# Patient Record
Sex: FEMALE | Race: ASIAN | NOT HISPANIC OR LATINO
[De-identification: names, ages, dates, MRNs, and addresses within clinical notes are randomized per-mention and may not be internally consistent; named-entity substitution may affect disease eponyms.]

---

## 2018-06-19 ENCOUNTER — RESULT REVIEW (OUTPATIENT)
Age: 53
End: 2018-06-19

## 2021-02-10 PROBLEM — Z00.00 ENCOUNTER FOR PREVENTIVE HEALTH EXAMINATION: Status: ACTIVE | Noted: 2021-02-10

## 2021-02-11 ENCOUNTER — OUTPATIENT (OUTPATIENT)
Dept: OUTPATIENT SERVICES | Facility: HOSPITAL | Age: 56
LOS: 1 days | End: 2021-02-11
Payer: COMMERCIAL

## 2021-02-11 ENCOUNTER — APPOINTMENT (OUTPATIENT)
Dept: MRI IMAGING | Facility: IMAGING CENTER | Age: 56
End: 2021-02-11
Payer: COMMERCIAL

## 2021-02-11 ENCOUNTER — RESULT REVIEW (OUTPATIENT)
Age: 56
End: 2021-02-11

## 2021-02-11 DIAGNOSIS — Z98.51 TUBAL LIGATION STATUS: Chronic | ICD-10-CM

## 2021-02-11 DIAGNOSIS — G54.2 CERVICAL ROOT DISORDERS, NOT ELSEWHERE CLASSIFIED: ICD-10-CM

## 2021-02-11 PROCEDURE — 72141 MRI NECK SPINE W/O DYE: CPT

## 2021-03-04 PROBLEM — Z00.00 ENCOUNTER FOR PREVENTIVE HEALTH EXAMINATION: Noted: 2021-03-04

## 2021-03-05 ENCOUNTER — APPOINTMENT (OUTPATIENT)
Dept: ORTHOPEDIC SURGERY | Facility: CLINIC | Age: 56
End: 2021-03-05
Payer: COMMERCIAL

## 2021-03-05 VITALS — BODY MASS INDEX: 22.66 KG/M2 | WEIGHT: 120 LBS | HEIGHT: 61 IN

## 2021-03-05 VITALS — DIASTOLIC BLOOD PRESSURE: 74 MMHG | SYSTOLIC BLOOD PRESSURE: 104 MMHG

## 2021-03-05 DIAGNOSIS — M47.812 SPONDYLOSIS W/OUT MYELOPATHY OR RADICULOPATHY, CERVICAL REGION: ICD-10-CM

## 2021-03-05 DIAGNOSIS — G89.29 OTHER SPECIFIED DISORDERS OF BONE, SHOULDER: ICD-10-CM

## 2021-03-05 DIAGNOSIS — M89.8X1 OTHER SPECIFIED DISORDERS OF BONE, SHOULDER: ICD-10-CM

## 2021-03-05 DIAGNOSIS — M50.90 CERVICAL DISC DISORDER, UNSPECIFIED, UNSPECIFIED CERVICAL REGION: ICD-10-CM

## 2021-03-05 RX ORDER — MELOXICAM 15 MG/1
15 TABLET ORAL DAILY
Qty: 14 | Refills: 0 | Status: ACTIVE | COMMUNITY
Start: 2021-03-05 | End: 1900-01-01

## 2021-03-06 PROBLEM — M50.90 CERVICAL DISC DISEASE: Status: ACTIVE | Noted: 2021-03-06

## 2021-03-06 PROBLEM — M89.8X1 CHRONIC SCAPULAR PAIN: Status: ACTIVE | Noted: 2021-03-06

## 2021-03-06 PROBLEM — M47.812 CERVICAL ARTHRITIS: Status: ACTIVE | Noted: 2021-03-06

## 2021-07-01 ENCOUNTER — EMERGENCY (EMERGENCY)
Facility: HOSPITAL | Age: 56
LOS: 1 days | Discharge: HOME | End: 2021-07-01
Admitting: STUDENT IN AN ORGANIZED HEALTH CARE EDUCATION/TRAINING PROGRAM
Payer: COMMERCIAL

## 2021-07-01 VITALS
HEIGHT: 62 IN | HEART RATE: 70 BPM | RESPIRATION RATE: 16 BRPM | OXYGEN SATURATION: 100 % | TEMPERATURE: 98 F | DIASTOLIC BLOOD PRESSURE: 72 MMHG | SYSTOLIC BLOOD PRESSURE: 134 MMHG

## 2021-07-01 VITALS
RESPIRATION RATE: 16 BRPM | TEMPERATURE: 98 F | OXYGEN SATURATION: 100 % | DIASTOLIC BLOOD PRESSURE: 70 MMHG | SYSTOLIC BLOOD PRESSURE: 112 MMHG | HEART RATE: 62 BPM

## 2021-07-01 DIAGNOSIS — Z98.51 TUBAL LIGATION STATUS: Chronic | ICD-10-CM

## 2021-07-01 PROCEDURE — 73140 X-RAY EXAM OF FINGER(S): CPT | Mod: 26,LT

## 2021-07-01 PROCEDURE — 99284 EMERGENCY DEPT VISIT MOD MDM: CPT

## 2021-07-01 RX ORDER — IBUPROFEN 200 MG
1 TABLET ORAL
Qty: 20 | Refills: 0
Start: 2021-07-01

## 2021-07-01 RX ORDER — TETANUS TOXOID, REDUCED DIPHTHERIA TOXOID AND ACELLULAR PERTUSSIS VACCINE, ADSORBED 5; 2.5; 8; 8; 2.5 [IU]/.5ML; [IU]/.5ML; UG/.5ML; UG/.5ML; UG/.5ML
0.5 SUSPENSION INTRAMUSCULAR ONCE
Refills: 0 | Status: COMPLETED | OUTPATIENT
Start: 2021-07-01 | End: 2021-07-01

## 2021-07-01 RX ORDER — IBUPROFEN 200 MG
600 TABLET ORAL ONCE
Refills: 0 | Status: COMPLETED | OUTPATIENT
Start: 2021-07-01 | End: 2021-07-01

## 2021-07-01 RX ADMIN — TETANUS TOXOID, REDUCED DIPHTHERIA TOXOID AND ACELLULAR PERTUSSIS VACCINE, ADSORBED 0.5 MILLILITER(S): 5; 2.5; 8; 8; 2.5 SUSPENSION INTRAMUSCULAR at 10:50

## 2021-07-01 RX ADMIN — Medication 1 TABLET(S): at 12:23

## 2021-07-01 RX ADMIN — Medication 600 MILLIGRAM(S): at 10:50

## 2021-07-01 NOTE — ED PROVIDER NOTE - CARE PLAN
Principal Discharge DX:	Closed nondisplaced fracture of distal phalanx of left thumb, initial encounter

## 2021-07-01 NOTE — ED PROVIDER NOTE - EXTREMITY EXAM
left thumb: +mild subungal hematoma, +swelling distally with TTP, FROM at PIP joint left thumb: superficial laceration to dorsal and volar aspect PIP region, no active bleeding +mild subungal hematoma, +swelling distally with TTP, FROM at PIP joint

## 2021-07-01 NOTE — ED PROVIDER NOTE - OBJECTIVE STATEMENT
57 yo female c pmhx HTN presents to ED c/o left thumb pain s/p injury yesterday.  Pt states jammed her finger between car door, woke up this am with worsening swelling and pain.  DEnies any other injuries.  Tdap not UTD.

## 2021-07-01 NOTE — ED ADULT NURSE REASSESSMENT NOTE - NS ED NURSE REASSESS COMMENT FT1
Pt vitally stable, No complaints at this time. Pt discharged. Discharge paperwork given to patient by MD.

## 2021-07-01 NOTE — ED PROVIDER NOTE - NSFOLLOWUPINSTRUCTIONS_ED_ALL_ED_FT
You have a thumb fracture that is not displaced.  Be sure to keep your thumb in the splint for atleast 4-6 weeks.  Take motrin 600mg every 8 hours as needed for pain.  Take augmentin 875mg 2x/day for 7 days.  Keep thumb elevated, ice, rest it.  Follow up with a hand specialist within one week.  Return to ED for any worsening swelling, numbness, fever.

## 2021-07-01 NOTE — ED ADULT TRIAGE NOTE - CHIEF COMPLAINT QUOTE
Pt c/o of pain to left thumb s/p door slammed yesterday. swelling and redness observed. Denies fever/cough.

## 2021-07-01 NOTE — ED PROVIDER NOTE - CLINICAL SUMMARY MEDICAL DECISION MAKING FREE TEXT BOX
57 yo female c pmhx HTN presents to ED c/o left thumb pain s/p injury yesterday.   +diffuse swelling distally with mild subungal hematoma; r/o fx, will get xray, nsaids for pain, tdap

## 2021-07-01 NOTE — ED PROVIDER NOTE - PROGRESS NOTE DETAILS
Pt noted to have distal phalanx fracture, nondisplaced, placed in splint, dc lounge assisting with hand follow up appt.   Will give antibiotics given subungal hematoma.

## 2021-07-01 NOTE — ED PROVIDER NOTE - PATIENT PORTAL LINK FT
You can access the FollowMyHealth Patient Portal offered by [facility] by registering at the following website: [email or web address]. By joining [employer]’s FollowMyHealth portal, you will also be able to view your health information using other applications (apps) compatible with our system.

## 2021-07-12 ENCOUNTER — NON-APPOINTMENT (OUTPATIENT)
Age: 56
End: 2021-07-12

## 2021-07-12 ENCOUNTER — APPOINTMENT (OUTPATIENT)
Dept: ORTHOPEDIC SURGERY | Facility: CLINIC | Age: 56
End: 2021-07-12
Payer: COMMERCIAL

## 2021-07-12 DIAGNOSIS — S62.525A NONDISPLACED FRACTURE OF DISTAL PHALANX OF LEFT THUMB, INITIAL ENCOUNTER FOR CLOSED FRACTURE: ICD-10-CM

## 2021-07-12 NOTE — DISCUSSION/SUMMARY
[de-identified] : The underlying pathophysiology was reviewed with the patient. XR films were reviewed with the patient. Discussed at length the nature of the patient’s condition. The left thumb symptoms appear secondary to fracture of the distal phalanx.\par \par I discussed with the patient that the total healing time of her fracture will be 4 weeks. It has been two weeks since the date of injury. \par Patient advised that the best treatment is movement of the affected joint.\par \par Patient can continue activities as tolerated. All questions answered, understanding verbalized. Patient in agreement with plan of care. Follow up in

## 2021-07-12 NOTE — HISTORY OF PRESENT ILLNESS
[de-identified] : Pt is a 57 y/o female with left thumb fracture.  She shut her finger in a car door on Thursday 7/1/21.  She had pain immediately.  She went to [facility] ED where xrays were taken.  They revealed a left thumb distal phalanx fracture.  She has ecchymosis in the nail bed.  A splint was applied and she was advised to follow up with a hand specialist.

## 2021-07-12 NOTE — PHYSICAL EXAM
[de-identified] : Patient is WDWN, alert, and in no acute distress. Breathing is unlabored. She is grossly oriented to person, place, \par and time.\par \par Left Hand (Thumb):\par Bruising, edema and ecchymosis\par Hematoma beneath the nail bed\par Sensation is normal\par Slight limitation with flexion at DIP joint [de-identified] : EXAM: XR FINGER(S) MIN 2 VIEWS LT - 7/1/21\par IMPRESSION: Fracture distal phalanx left thumb.\par \par HARPREET MEDINA MD; Attending Radiologist\par This document has been electronically signed. Jul 1 2021 11:10AM\par \par \par 7/12/21 - \par AP, lateral and oblique views of the left thumb were obtained today and revealed a fracture of the distal phalanx.

## 2021-07-12 NOTE — END OF VISIT
[FreeTextEntry3] : All medical record entries made by the Scribe were at my,  Dr. Mike Murray MD., direction and personally dictated by me on 07/12/2021. I have personally reviewed the chart and agree that the record accurately reflects my personal performance of the history, physical exam, assessment and plan.\par \par

## 2021-07-12 NOTE — RETURN TO WORK/SCHOOL
[FreeTextEntry1] : Ms. JERROD MARTINEZ was seen in the office today on 07/12/2021 and evaluated by me for an Orthopedic visit. Please be advised that she will be out of work until further notice. \par \par Sincerely, \par \par Dr. Mike Murray on Jul 12, 2021.\par \par \par

## 2021-07-12 NOTE — ADDENDUM
[FreeTextEntry1] : I, Johanna Fabian wrote this note acting as a scribe for Dr. Mike Murray on Jul 12, 2021.\par \par

## 2021-07-26 ENCOUNTER — APPOINTMENT (OUTPATIENT)
Dept: ORTHOPEDIC SURGERY | Facility: CLINIC | Age: 56
End: 2021-07-26
Payer: COMMERCIAL

## 2021-07-26 RX ORDER — TIZANIDINE 2 MG/1
2 TABLET ORAL
Qty: 30 | Refills: 0 | Status: ACTIVE | COMMUNITY
Start: 2021-06-16

## 2021-07-26 RX ORDER — AMLODIPINE BESYLATE 10 MG/1
10 TABLET ORAL
Qty: 90 | Refills: 0 | Status: ACTIVE | COMMUNITY
Start: 2020-10-07

## 2021-07-26 RX ORDER — AMOXICILLIN AND CLAVULANATE POTASSIUM 875; 125 MG/1; MG/1
875-125 TABLET, COATED ORAL
Qty: 14 | Refills: 0 | Status: ACTIVE | COMMUNITY
Start: 2021-07-01

## 2021-07-26 RX ORDER — IBUPROFEN 600 MG/1
600 TABLET, FILM COATED ORAL
Qty: 20 | Refills: 0 | Status: ACTIVE | COMMUNITY
Start: 2021-07-01

## 2021-07-26 NOTE — DISCUSSION/SUMMARY
[FreeTextEntry1] : The underlying pathophysiology was reviewed with the patient. XR films were reviewed with the patient. Discussed at length the nature of the patient’s condition. The left thumb symptoms appear secondary to fracture of the distal phalanx.\par \par Patient was advised that the nail may fall off due to the trauma of the injury. I am not recommending removal of the nail bed at this time.\par Continue with soaks in warm water and Epsom salts.\par Instructed on continued ROM exercises at IP joint.\par Advised to keep the finger wrapped in a Band-Aid when doing house work as to not risk infection.\par NSAIDs prn\par \par Patient can continue activities as tolerated. All questions answered, understanding verbalized. Patient in agreement with plan of care. Follow up in

## 2021-07-26 NOTE — ADDENDUM
[FreeTextEntry1] : I, Johanna Fabian wrote this note acting as a scribe for Dr. Mike Murray on Jul 26, 2021.\par \par \par

## 2021-07-26 NOTE — HISTORY OF PRESENT ILLNESS
[FreeTextEntry1] : Pt is a 55 y/o female with left thumb fracture. She shut her finger in a car door on Thursday 7/1/21. She had pain immediately. She went to [facility] ED where xrays were taken. They revealed a left thumb distal phalanx fracture. She has ecchymosis in the nail bed. A splint was applied and she was advised to follow up with a hand specialist. She returns on 7/26/21 complaining of pain and swelling. She reports soaking the hand in warm water and Epsom salts. Due to discomfort, patient is inquiring about possible removal of the nail. Of note, she has not returned to work.\par

## 2021-07-26 NOTE — PHYSICAL EXAM
[de-identified] : Patient is WDWN, alert, and in no acute distress. Breathing is unlabored. She is grossly oriented to person, place, \par and time.\par \par Left Hand (Thumb):\par Bruising, edema and ecchymosis\par Hematoma beneath the nail bed\par Sensation is normal\par Slight limitation with flexion at IP joint  [de-identified] : AP, lateral and oblique views of the left thumb were obtained today and revealed a fracture of the distal phalanx. Fracture healing in good position.

## 2021-07-26 NOTE — END OF VISIT
[FreeTextEntry3] : All medical record entries made by the Scribe were at my,  Dr. Mike Murray MD., direction and personally dictated by me on 07/26/2021. I have personally reviewed the chart and agree that the record accurately reflects my personal performance of the history, physical exam, assessment and plan.\par

## 2021-08-26 ENCOUNTER — APPOINTMENT (OUTPATIENT)
Dept: ORTHOPEDIC SURGERY | Facility: CLINIC | Age: 56
End: 2021-08-26
Payer: COMMERCIAL

## 2021-08-26 DIAGNOSIS — S62.525D NONDISPLACED FRACTURE OF DISTAL PHALANX OF LEFT THUMB, SUBSEQUENT ENCOUNTER FOR FRACTURE WITH ROUTINE HEALING: ICD-10-CM

## 2021-08-26 NOTE — PHYSICAL EXAM
[de-identified] : Patient is WDWN, alert, and in no acute distress. Breathing is unlabored. She is grossly oriented to person, place, and time.\par \par Left Hand (Thumb):\par Hematoma beneath the nail bed has resolved.\par Sensation is normal\par Slight limitation with flexion at IP joint  [de-identified] : AP, lateral and oblique views of the left thumb were obtained today and revealed a fracture of the distal phalanx. Fracture healed in good position.

## 2021-08-26 NOTE — DISCUSSION/SUMMARY
[FreeTextEntry1] : The underlying pathophysiology was reviewed with the patient. XR films were reviewed with the patient. Discussed at length the nature of the patient’s condition. The left thumb symptoms appear secondary to fracture of the distal phalanx.\par \par She was advised she may continue all activities as tolerated and without restriction.\par A note was provided for work stating she may return to work starting on 8/27/21 at full duty, without restrictions.\par \par Patient can continue activities as tolerated. All questions answered, understanding verbalized. Patient in agreement with plan of care. Follow up as needed.

## 2021-08-26 NOTE — RETURN TO WORK/SCHOOL
[FreeTextEntry1] : Ms. JERROD MARTINEZ was seen in the office today on 08/26/2021 and evaluated by me for an Orthopedic visit. Please be advised that she may return to work on Friday, 08/27/21 on full duty without restrictions. \par \par Sincerely, \par \par Dr. Mike Lacey M.D. on 08/26/2021. \par [address] [facility]\[place] [address]\par Phone Number: [phone]\par License Number: [id number]\par NPI Number: [id number]

## 2021-08-26 NOTE — ADDENDUM
[FreeTextEntry1] : I, Johanna Fabian wrote this note acting as a scribe for Dr. Mike Murray on Aug 26, 2021.

## 2021-08-26 NOTE — HISTORY OF PRESENT ILLNESS
[FreeTextEntry1] : Pt is a 57 y/o female with left thumb fracture. She shut her finger in a car door on Thursday 7/1/21. She had pain immediately. She went to [facility] ED where xrays were taken. They revealed a left thumb distal phalanx fracture. She was advised to soak the hand in warm water and Epsom salts and was instructed on ROM exercises of the thumb. She presents on 8/26/21 and reports she is doing well overall. She has continued to soak the hand in warm water and Epsom salts. She notes mild discomfort with flexion of the digits.

## 2021-08-26 NOTE — END OF VISIT
[FreeTextEntry3] : All medical record entries made by the Scribe were at my,  Dr. Mike Murray MD., direction and personally dictated by me on 08/26/2021. I have personally reviewed the chart and agree that the record accurately reflects my personal performance of the history, physical exam, assessment and plan.

## 2021-10-27 NOTE — ED PROCEDURE NOTE - NS_EDPROVIDERDISPOUSERTYPE_ED_A_ED
I have personally evaluated and examined the patient. The Attending was available to me as a supervising provider if needed. 36.1